# Patient Record
Sex: FEMALE | Race: WHITE | NOT HISPANIC OR LATINO | URBAN - METROPOLITAN AREA
[De-identification: names, ages, dates, MRNs, and addresses within clinical notes are randomized per-mention and may not be internally consistent; named-entity substitution may affect disease eponyms.]

---

## 2024-01-27 ENCOUNTER — APPOINTMENT (OUTPATIENT)
Dept: RADIOLOGY | Facility: HOSPITAL | Age: 59
End: 2024-01-27
Payer: COMMERCIAL

## 2024-01-27 ENCOUNTER — HOSPITAL ENCOUNTER (EMERGENCY)
Facility: HOSPITAL | Age: 59
Discharge: HOME | End: 2024-01-27
Payer: COMMERCIAL

## 2024-01-27 VITALS
DIASTOLIC BLOOD PRESSURE: 69 MMHG | BODY MASS INDEX: 33.77 KG/M2 | TEMPERATURE: 99.3 F | RESPIRATION RATE: 20 BRPM | HEIGHT: 60 IN | HEART RATE: 91 BPM | WEIGHT: 172 LBS | SYSTOLIC BLOOD PRESSURE: 123 MMHG | OXYGEN SATURATION: 98 %

## 2024-01-27 DIAGNOSIS — M19.90 ARTHRITIS: Primary | ICD-10-CM

## 2024-01-27 DIAGNOSIS — S89.92XA INJURY OF LEFT KNEE, INITIAL ENCOUNTER: ICD-10-CM

## 2024-01-27 PROCEDURE — 73564 X-RAY EXAM KNEE 4 OR MORE: CPT | Mod: LT

## 2024-01-27 PROCEDURE — 2500000001 HC RX 250 WO HCPCS SELF ADMINISTERED DRUGS (ALT 637 FOR MEDICARE OP): Performed by: PHYSICIAN ASSISTANT

## 2024-01-27 PROCEDURE — 99284 EMERGENCY DEPT VISIT MOD MDM: CPT | Mod: 25,27 | Performed by: PHYSICIAN ASSISTANT

## 2024-01-27 PROCEDURE — 73564 X-RAY EXAM KNEE 4 OR MORE: CPT | Mod: LEFT SIDE | Performed by: RADIOLOGY

## 2024-01-27 PROCEDURE — 73700 CT LOWER EXTREMITY W/O DYE: CPT | Mod: LT

## 2024-01-27 PROCEDURE — 73700 CT LOWER EXTREMITY W/O DYE: CPT | Mod: LEFT SIDE | Performed by: RADIOLOGY

## 2024-01-27 RX ORDER — IBUPROFEN 400 MG/1
400 TABLET ORAL ONCE
Status: COMPLETED | OUTPATIENT
Start: 2024-01-27 | End: 2024-01-27

## 2024-01-27 RX ADMIN — IBUPROFEN 400 MG: 400 TABLET, FILM COATED ORAL at 13:59

## 2024-01-27 ASSESSMENT — LIFESTYLE VARIABLES
EVER FELT BAD OR GUILTY ABOUT YOUR DRINKING: NO
HAVE YOU EVER FELT YOU SHOULD CUT DOWN ON YOUR DRINKING: NO
EVER HAD A DRINK FIRST THING IN THE MORNING TO STEADY YOUR NERVES TO GET RID OF A HANGOVER: NO
HAVE PEOPLE ANNOYED YOU BY CRITICIZING YOUR DRINKING: NO
REASON UNABLE TO ASSESS: YES

## 2024-01-27 ASSESSMENT — PAIN SCALES - GENERAL: PAINLEVEL_OUTOF10: 6

## 2024-01-27 ASSESSMENT — COLUMBIA-SUICIDE SEVERITY RATING SCALE - C-SSRS
1. IN THE PAST MONTH, HAVE YOU WISHED YOU WERE DEAD OR WISHED YOU COULD GO TO SLEEP AND NOT WAKE UP?: NO
2. HAVE YOU ACTUALLY HAD ANY THOUGHTS OF KILLING YOURSELF?: NO
6. HAVE YOU EVER DONE ANYTHING, STARTED TO DO ANYTHING, OR PREPARED TO DO ANYTHING TO END YOUR LIFE?: NO

## 2024-01-27 ASSESSMENT — PAIN - FUNCTIONAL ASSESSMENT: PAIN_FUNCTIONAL_ASSESSMENT: 0-10

## 2024-01-27 NOTE — DISCHARGE INSTRUCTIONS
Please follow-up with orthopedics due to your knee injury.  You may need further imaging or testing due to the injury and your ongoing pain.  You were given a knee immobilizer to help you ambulate.  Rest, ice, elevate your leg due to the injury.  You can take over-the-counter Tylenol or ibuprofen for pain control.  Return to ER if your symptoms change or worsen.

## 2024-01-27 NOTE — ED PROVIDER NOTES
Limitations to History: none  External Records Reviewed  Independent Historians: none  Social determinants affecting care: none    HPI  Larissa Jimenes is a 58 y.o. female who presents emergency department for assessment of left knee injury today.  She reports that she lost her balance and she landed directly on her left knee.  She has been having knee pain on and off for the last month or so and this seemed to make her pain worse.  Pain is located to the left lateral knee.  She denies any loss of function of the knee.  She denies numbness or tingling.  She is been taking Tylenol with arthritis which seems to help her pain.  She denies sustaining any other injuries.  She has no further complaints.  Her  is at bedside.    [facility]  No past medical history on file. reviewed by myself.    Meds  No current outpatient medications    Allergies  No Known Allergies reviewed by myself.    SHx    reviewed by myself.      ------------------------------------------------------------------------------------------------------------------------------------------    /69 (BP Location: Right arm, Patient Position: Sitting)   Pulse 91   Temp 37.4 °C (99.3 °F) (Skin)   Resp 20   Ht 1.524 m (5')   Wt 78 kg (172 lb)   SpO2 98%   BMI 33.59 kg/m²     Physical Exam  Vitals and nursing note reviewed.   Constitutional:       General: She is not in acute distress.     Appearance: Normal appearance. She is normal weight. She is not ill-appearing or toxic-appearing.   HENT:      Head: Normocephalic.      Nose: Nose normal.      Mouth/Throat:      Mouth: Mucous membranes are moist.   Eyes:      Extraocular Movements: Extraocular movements intact.      Conjunctiva/sclera: Conjunctivae normal.   Cardiovascular:      Rate and Rhythm: Normal rate and regular rhythm.   Pulmonary:      Effort: Pulmonary effort is normal.      Breath sounds: Normal breath sounds.   Musculoskeletal:      Cervical back: Neck supple.      Left knee: No  swelling, erythema or ecchymosis. Normal range of motion. Tenderness present over the lateral joint line. Normal pulse.      Left ankle: Normal.      Left foot: Normal. Normal capillary refill. Normal pulse.   Skin:     General: Skin is warm and dry.   Neurological:      General: No focal deficit present.      Mental Status: She is alert and oriented to person, place, and time.   Psychiatric:         Attention and Perception: Attention normal.         Mood and Affect: Mood normal.          ------------------------------------------------------------------------------------------------------------------------------------------  Imaging  CT knee left wo IV contrast   Final Result   Moderate to advanced degenerative change without fracture.   Signed by Gregg Deleon MD      XR knee left 4+ views   Final Result   Possible fracture irregularity of the distal femur with densities in   the intercondylar notch and joint effusion. Consider further   evaluation with CT as clinically warranted.        MACRO:   None.        Signed by: Margarita Grewal 1/27/2024 2:51 PM   Dictation workstation:   [id number]           Labs  Labs Reviewed - No data to display     ED Course  ED Course as of 01/27/24 1839   Sat Jan 27, 2024   1645 I called radiology as the CT was not read after a hour.  [place] reports that she will notify [facility] radiology to read this image []   1736 I called radiology again as the CT has not resulted.   reports that [facility] will [facility] to expedite this reading. []   1759 Patient updated that the CT has not resulted.  She was offered something further for pain and declined. []      ED Course User Index  [] Alex Giraldo PA-C         Diagnoses as of 01/27/24 1839   Arthritis   Injury of left knee, initial encounter        Medical Decision Making: She was evaluated by myself.  She did not appear ill or toxic.  Vital signs reviewed and stable.    Differential diagnoses  considered: Fracture, arthritis, joint effusion, contusion, sprain, strain, tear, others    Medications given: Oral Motrin    X-ray is showing possible fracture of the distal femur recommending CT imaging.  Patient updated on this and agreeable to CT imaging.  CT of the knee shows no acute fracture.  There is arthritis throughout.  I discussed this with the patient.  I recommend she see orthopedics due to her ongoing knee pain with new injury.  She was placed in a knee immobilizer and given walker to help her ambulate.  She was instructed to rest, ice, elevate her leg at home.  She can take Tylenol or Motrin at home for pain.  She is to see orthopedics within 1 week.  She is to return to ER immediately if symptoms change or worsen.  She verbalized understanding and agreed to plan of care.  She was discharged home in stable condition.    Diagnosis: Left knee pain, left knee injury  Plan: Discharge         Alex Giraldo PA-C  01/27/24 [id number]

## 2024-06-10 PROBLEM — B02.9 HERPES ZOSTER: Status: ACTIVE | Noted: 2024-06-10

## 2024-06-10 PROBLEM — S82.121A CLOSED FRACTURE OF LATERAL PORTION OF RIGHT TIBIAL PLATEAU: Status: ACTIVE | Noted: 2020-12-03

## 2024-06-10 PROBLEM — S82.831A CLOSED FRACTURE OF RIGHT DISTAL FIBULA: Status: ACTIVE | Noted: 2020-12-03

## 2024-06-10 RX ORDER — LISINOPRIL AND HYDROCHLOROTHIAZIDE 10; 12.5 MG/1; MG/1
TABLET ORAL
COMMUNITY
Start: 2020-10-29

## 2024-06-10 RX ORDER — SIMVASTATIN 20 MG/1
TABLET, FILM COATED ORAL
COMMUNITY
Start: 2020-11-10

## 2024-06-10 RX ORDER — GLIMEPIRIDE 4 MG/1
TABLET ORAL
COMMUNITY
Start: 2020-09-20

## 2024-06-10 RX ORDER — VALACYCLOVIR HYDROCHLORIDE 1 G/1
1 TABLET, FILM COATED ORAL 3 TIMES DAILY
COMMUNITY
Start: 2018-09-30

## 2024-06-10 RX ORDER — METFORMIN HYDROCHLORIDE 1000 MG/1
TABLET ORAL
COMMUNITY
Start: 2020-09-20

## 2024-06-10 RX ORDER — LEVOTHYROXINE SODIUM 25 UG/1
TABLET ORAL
COMMUNITY
Start: 2020-10-22

## 2024-06-12 ENCOUNTER — CLINICAL SUPPORT (OUTPATIENT)
Dept: AUDIOLOGY | Facility: CLINIC | Age: 59
End: 2024-06-12
Payer: COMMERCIAL

## 2024-06-12 ENCOUNTER — OFFICE VISIT (OUTPATIENT)
Dept: OTOLARYNGOLOGY | Facility: CLINIC | Age: 59
End: 2024-06-12
Payer: COMMERCIAL

## 2024-06-12 VITALS
WEIGHT: 181 LBS | OXYGEN SATURATION: 99 % | BODY MASS INDEX: 34.17 KG/M2 | HEIGHT: 61 IN | TEMPERATURE: 97.5 F | HEART RATE: 68 BPM

## 2024-06-12 DIAGNOSIS — H91.92 PROFOUND HEARING LOSS OF LEFT EAR: Primary | ICD-10-CM

## 2024-06-12 DIAGNOSIS — H61.23 EXCESSIVE CERUMEN IN BOTH EAR CANALS: ICD-10-CM

## 2024-06-12 DIAGNOSIS — H93.8X1 CRACKLING SOUND IN RIGHT EAR: ICD-10-CM

## 2024-06-12 DIAGNOSIS — H91.8X3 ASYMMETRICAL HEARING LOSS: Primary | ICD-10-CM

## 2024-06-12 DIAGNOSIS — H91.91 HEARING LOSS OF RIGHT EAR, UNSPECIFIED HEARING LOSS TYPE: ICD-10-CM

## 2024-06-12 PROCEDURE — 99213 OFFICE O/P EST LOW 20 MIN: CPT | Performed by: NURSE PRACTITIONER

## 2024-06-12 PROCEDURE — 1036F TOBACCO NON-USER: CPT | Performed by: NURSE PRACTITIONER

## 2024-06-12 PROCEDURE — 92557 COMPREHENSIVE HEARING TEST: CPT | Performed by: AUDIOLOGIST

## 2024-06-12 PROCEDURE — 99203 OFFICE O/P NEW LOW 30 MIN: CPT | Performed by: NURSE PRACTITIONER

## 2024-06-12 PROCEDURE — 92567 TYMPANOMETRY: CPT | Performed by: AUDIOLOGIST

## 2024-06-12 RX ORDER — ALBUTEROL SULFATE 90 UG/1
AEROSOL, METERED RESPIRATORY (INHALATION)
COMMUNITY
Start: 2024-02-11

## 2024-06-12 RX ORDER — DAPAGLIFLOZIN 10 MG/1
1 TABLET, FILM COATED ORAL
COMMUNITY
Start: 2024-05-24

## 2024-06-12 SDOH — ECONOMIC STABILITY: FOOD INSECURITY: WITHIN THE PAST 12 MONTHS, THE FOOD YOU BOUGHT JUST DIDN'T LAST AND YOU DIDN'T HAVE MONEY TO GET MORE.: NEVER TRUE

## 2024-06-12 SDOH — ECONOMIC STABILITY: FOOD INSECURITY: WITHIN THE PAST 12 MONTHS, YOU WORRIED THAT YOUR FOOD WOULD RUN OUT BEFORE YOU GOT MONEY TO BUY MORE.: NEVER TRUE

## 2024-06-12 ASSESSMENT — PATIENT HEALTH QUESTIONNAIRE - PHQ9
2. FEELING DOWN, DEPRESSED OR HOPELESS: NOT AT ALL
SUM OF ALL RESPONSES TO PHQ9 QUESTIONS 1 AND 2: 0
1. LITTLE INTEREST OR PLEASURE IN DOING THINGS: NOT AT ALL

## 2024-06-12 ASSESSMENT — LIFESTYLE VARIABLES
AUDIT-C TOTAL SCORE: 0
HOW OFTEN DO YOU HAVE SIX OR MORE DRINKS ON ONE OCCASION: NEVER
HOW OFTEN DO YOU HAVE A DRINK CONTAINING ALCOHOL: NEVER
SKIP TO QUESTIONS 9-10: 1
HOW MANY STANDARD DRINKS CONTAINING ALCOHOL DO YOU HAVE ON A TYPICAL DAY: PATIENT DOES NOT DRINK

## 2024-06-12 ASSESSMENT — ENCOUNTER SYMPTOMS
LOSS OF SENSATION IN FEET: 0
OCCASIONAL FEELINGS OF UNSTEADINESS: 0
DEPRESSION: 0

## 2024-06-12 ASSESSMENT — COLUMBIA-SUICIDE SEVERITY RATING SCALE - C-SSRS
6. HAVE YOU EVER DONE ANYTHING, STARTED TO DO ANYTHING, OR PREPARED TO DO ANYTHING TO END YOUR LIFE?: NO
2. HAVE YOU ACTUALLY HAD ANY THOUGHTS OF KILLING YOURSELF?: NO
1. IN THE PAST MONTH, HAVE YOU WISHED YOU WERE DEAD OR WISHED YOU COULD GO TO SLEEP AND NOT WAKE UP?: NO

## 2024-06-12 ASSESSMENT — PAIN SCALES - GENERAL: PAINLEVEL: 0-NO PAIN

## 2024-06-12 NOTE — PROGRESS NOTES
"AUDIOLOGY ADULT AUDIOMETRIC EVALUATION      Name:  Larissa Jimenes  :  1965  Age:  59 y.o.  Date of Evaluation:  2024    HISTORY  Reason for visit:  hearing loss  Ms. Jimenes is seen 24 at the request of Morena Montalvo CNP  for an evaluation of hearing.      Chief complaint:    Lifelong hearing loss  - worse recently  - no hearing in left ear since childhood  - etiology unknown     - first wore hearing aid about 15 years  - wears a right hearing aid    Hearing loss:  yes  Tinnitus:   denies  Otitis Media: denies  Otologic surgical history:  denies  Dizziness/imbalance:  imbalance  Otalgia:  denies  Ear pressure/fullness:  right ear someties feels \"closed\" and improves if ear is \"popped\"  History of excessive noise exposure:  kids  Other: had covid-19 infection 4 years ago and lost hearing for a week          EVALUATION  Please find audiogram in \"Media\" tab (Document Type:  Audiology Report) or included at the bottom of this note.    RESULTS   Otoscopic Evaluation: non-occlusive cerumen bilaterally      Immittance Measures (226 Hz probe tone):      Right ear: Tympanometry is consistent with high middle ear pressure and restricted tympanic membrane mobility.     Left ear: Tympanometry is consistent with high middle ear pressure and normal tympanic membrane mobility.         Test technique:  standard behavioral technique via insert earphones.  Reliability is good.    Pure Tone Audiometry:    Right ear:  Hearing sensitivity is in the moderately-severe to severe hearing loss range.  Bone-conduction thresholds could not be masked due to limits of the equipment.     Unspecified bone-conduction thresholds  are consistent with possible right air-bone gaps.      Left ear: Profound hearing loss    Speech Audiometry:        Right Ear:  Speech Reception Threshold (SRT) was obtained at 65 dBHL                 Speech discrimination score was 92% in quiet when words were presented at 105 dBHL      Left Ear:  " Patient was unable to detect speech at the limits of the equipment.      IMPRESSIONS:  Patient is expected to experience communication difficulty in all listening situations.   Patient is expected to continue to benefit from devices that provide amplification (e.g., a right hearing aid) and improve the desired sound signal over that of background noise, as well as from effective communication strategies.       RECOMMENDATIONS  Continue with medical follow-up with Morena Montalvo CNP.  Continue with hearing aid use.    Reassess hearing in conjunction with medical management.    Continue with medical follow-up as indicated.       PATIENT EDUCATION  Discussed results and recommendations with patient.  Questions were addressed and the patient was encouraged to contact our department should concerns arise.       ELVIA Zarate, [facility]  Licensed Audiologist

## 2024-06-12 NOTE — PROGRESS NOTES
Subjective   Patient ID: Larissa Jimenes is a 59 y.o. female who presents for Hearing Loss.    HPI  Patient here for hearing loss. Accompanied by her .   She has had hearing loss since she is young, has not been able to hear out of the left ear. The right had some hearing loss. Denies tinnitus, ear pain or drainage. Denies vertigo.  She wears a hearing aid in the right for the last 12-15 years. This has been her only hearing aid, has not had it serviced. Over the last month she has noticed her hearing is worse on the right side. She has to pop her ears, they are crackling. No recent illness.    She had mumps when she was younger, which she thinks is what caused the hearing loss but is not completely sure.   Denies previous ear surgery, loud noise exposure, and family history of hearing loss.     Patient Active Problem List   Diagnosis    Herpes zoster    Closed fracture of right distal fibula    Closed fracture of lateral portion of right tibial plateau     History reviewed. No pertinent surgical history.    Review of Systems    All other systems have been reviewed and are negative for complaints except for those mentioned in history of present illness, past medical history and problem list.    Objective   Physical Exam    Constitutional: No fever, chills, weight loss or weight gain  General appearance: Appears well, well-nourished, well groomed. No acute distress.    Communication: Normal communication    Psychiatric: Oriented to person, place and time. Normal mood and affect.    Neurologic: Cranial nerves II-XII grossly intact and symmetric bilaterally.    Head and Face:  Head: Atraumatic with no masses, lesions or scarring.  Face: Normal symmetry. No scars or deformities.  TMJ: Normal, no trismus.    Eyes: Conjunctiva not edematous or erythematous.     Right Ear: External inspection of ear with no deformity, scars, or masses. Ear canal is with non-occluding cerumen that was removed using the microscope and  alligator forceps. After removal, TM is intact with no sign of infection, effusion, or retraction.      Left Ear: External inspection of ear with no deformity, scars, or masses. Ear canal is with non-occluding cerumen that was removed using the microscope and alligator forceps. After removal, TM is intact with no sign of infection, effusion, or retraction.      Nose: External inspection of nose: No nasal lesions, lacerations or scars. Anterior rhinoscopy with limited visualization past the inferior turbinates. No tenderness on frontal or maxillary sinus palpation.    Oral Cavity/Mouth: Oral cavity and oropharynx mucosa moist and pink. No lesions or masses. Dentition normal. Tonsils appear normal. Uvula is midline. Tongue with no masses or lesions. Tongue with good mobility. The oropharynx is clear.    Neck: Normal appearing, symmetric, trachea midline.     Cardiovascular: Examination of peripheral vascular system shows no clubbing or cyanosis.    Respiratory: No respiratory distress increased work of breathing. Inspection of the chest with symmetric chest expansion and normal respiratory effort.    Skin: No head and neck rashes.    Lymph nodes: No adenopathy.     Diagnostic Results   I personally interpreted audiogram from today which shows:  Right ear: Severe hearing loss. Type As tympanogram. Excellent WRS of 92% at 105 dB.   Left ear: Profound hearing loss. Type A tympanograms.     Assessment/Plan   Diagnoses and all orders for this visit:  Asymmetrical hearing loss  Crackling sound in right ear  We discussed the different etiologies of asymmetry including noise exposure, trauma, blood flow issues, viral inflammation, and acoustic neuroma.  I recommend MRI IAC to rule out retrocochlear pathology.  Since patient has had this hearing loss since childhood, she does not wish to pursue imaging at this time.     Since patient's hearing aid is 10+ years old, we discussed reaching out to her insurance company to inquire  about hearing aid benefits. If patient has no hearing aid benefit, she may call the office at [phone] to schedule a hearing aid consultation here at [facility].      Excessive cerumen in both ear canals  Ears were successfully cleaned. Patient feels the hearing is slightly better after cerumen removal.     Repeat audiogram annually to monitor hearing. Consider repeating ear cleaning in 6 months.  All questions answered to patient satisfaction.        DELANO Milan-CNP 06/12/24 9:23 AM